# Patient Record
Sex: MALE | Race: BLACK OR AFRICAN AMERICAN | URBAN - METROPOLITAN AREA
[De-identification: names, ages, dates, MRNs, and addresses within clinical notes are randomized per-mention and may not be internally consistent; named-entity substitution may affect disease eponyms.]

---

## 2018-05-30 RX ORDER — HYDROCHLOROTHIAZIDE 25 MG/1
25 TABLET ORAL DAILY
Qty: 30 TABLET | Refills: 0 | Status: SHIPPED | OUTPATIENT
Start: 2018-05-30 | End: 2018-06-01 | Stop reason: ALTCHOICE

## 2018-06-01 ENCOUNTER — OFFICE VISIT (OUTPATIENT)
Dept: INTERNAL MEDICINE CLINIC | Age: 23
End: 2018-06-01

## 2018-06-01 VITALS
HEART RATE: 87 BPM | HEIGHT: 68 IN | SYSTOLIC BLOOD PRESSURE: 152 MMHG | OXYGEN SATURATION: 98 % | DIASTOLIC BLOOD PRESSURE: 102 MMHG | BODY MASS INDEX: 38.95 KG/M2 | WEIGHT: 257 LBS | TEMPERATURE: 98.3 F

## 2018-06-01 DIAGNOSIS — I10 ESSENTIAL HYPERTENSION: ICD-10-CM

## 2018-06-01 DIAGNOSIS — E11.9 CONTROLLED TYPE 2 DIABETES MELLITUS WITHOUT COMPLICATION, WITHOUT LONG-TERM CURRENT USE OF INSULIN (HCC): Primary | ICD-10-CM

## 2018-06-01 DIAGNOSIS — Z11.4 SCREENING FOR HIV (HUMAN IMMUNODEFICIENCY VIRUS): ICD-10-CM

## 2018-06-01 DIAGNOSIS — L83 ACANTHOSIS NIGRICANS: ICD-10-CM

## 2018-06-01 PROCEDURE — 99204 OFFICE O/P NEW MOD 45 MIN: CPT | Performed by: INTERNAL MEDICINE

## 2018-06-01 RX ORDER — LISINOPRIL 10 MG/1
10 TABLET ORAL DAILY
Qty: 30 TABLET | Refills: 3 | Status: SHIPPED | OUTPATIENT
Start: 2018-06-01

## 2018-06-01 RX ORDER — ATORVASTATIN CALCIUM 10 MG/1
10 TABLET, FILM COATED ORAL DAILY
Qty: 30 TABLET | Refills: 3 | Status: SHIPPED | OUTPATIENT
Start: 2018-06-01 | End: 2018-06-29 | Stop reason: SDUPTHER

## 2018-06-01 RX ORDER — GLIPIZIDE 5 MG/1
5 TABLET ORAL DAILY
Refills: 0 | COMMUNITY
Start: 2018-05-06 | End: 2018-06-25

## 2018-06-01 ASSESSMENT — ENCOUNTER SYMPTOMS
VOMITING: 0
DIARRHEA: 0
WHEEZING: 0
TROUBLE SWALLOWING: 0
NAUSEA: 0
ABDOMINAL PAIN: 0
SHORTNESS OF BREATH: 0
SORE THROAT: 0

## 2018-06-01 ASSESSMENT — PATIENT HEALTH QUESTIONNAIRE - PHQ9
1. LITTLE INTEREST OR PLEASURE IN DOING THINGS: 0
2. FEELING DOWN, DEPRESSED OR HOPELESS: 0
SUM OF ALL RESPONSES TO PHQ9 QUESTIONS 1 & 2: 0
SUM OF ALL RESPONSES TO PHQ QUESTIONS 1-9: 0

## 2018-06-04 DIAGNOSIS — L83 ACANTHOSIS NIGRICANS: ICD-10-CM

## 2018-06-04 DIAGNOSIS — I10 ESSENTIAL HYPERTENSION: ICD-10-CM

## 2018-06-04 DIAGNOSIS — E78.2 MIXED HYPERLIPIDEMIA: Primary | ICD-10-CM

## 2018-06-04 DIAGNOSIS — E11.9 CONTROLLED TYPE 2 DIABETES MELLITUS WITHOUT COMPLICATION, WITHOUT LONG-TERM CURRENT USE OF INSULIN (HCC): ICD-10-CM

## 2018-06-04 DIAGNOSIS — Z11.4 SCREENING FOR HIV (HUMAN IMMUNODEFICIENCY VIRUS): ICD-10-CM

## 2018-06-04 LAB
CHOLESTEROL, TOTAL: 138 MG/DL (ref 0–199)
HDLC SERPL-MCNC: 26 MG/DL (ref 40–60)
LDL CHOLESTEROL CALCULATED: ABNORMAL MG/DL
LDL CHOLESTEROL DIRECT: 54 MG/DL
TRIGL SERPL-MCNC: 460 MG/DL (ref 0–150)
TSH REFLEX: 0.91 UIU/ML (ref 0.27–4.2)
VLDLC SERPL CALC-MCNC: ABNORMAL MG/DL

## 2018-06-05 LAB
ESTIMATED AVERAGE GLUCOSE: 306.3 MG/DL
HBA1C MFR BLD: 12.3 %
HIV AG/AB: NORMAL
HIV ANTIGEN: NORMAL
HIV-1 ANTIBODY: NORMAL
HIV-2 AB: NORMAL

## 2018-06-26 PROBLEM — E44.0 MODERATE MALNUTRITION (HCC): Status: ACTIVE | Noted: 2018-06-26

## 2018-06-26 PROBLEM — E11.10 DKA, TYPE 2, NOT AT GOAL (HCC): Status: ACTIVE | Noted: 2018-06-26

## 2018-06-26 PROBLEM — E66.9 DIABETES MELLITUS TYPE 2 IN OBESE (HCC): Status: ACTIVE | Noted: 2018-06-26

## 2018-06-26 PROBLEM — E11.69 DIABETES MELLITUS TYPE 2 IN OBESE (HCC): Status: ACTIVE | Noted: 2018-06-26

## 2018-06-26 PROBLEM — E78.2 MIXED HYPERLIPIDEMIA: Status: ACTIVE | Noted: 2018-06-26

## 2018-06-26 PROBLEM — I10 ESSENTIAL HYPERTENSION: Status: ACTIVE | Noted: 2018-06-26

## 2018-06-27 RX ORDER — HYDROCHLOROTHIAZIDE 25 MG/1
TABLET ORAL
Qty: 30 TABLET | Refills: 0 | Status: SHIPPED | OUTPATIENT
Start: 2018-06-27 | End: 2018-06-29 | Stop reason: ALTCHOICE

## 2018-06-29 ENCOUNTER — OFFICE VISIT (OUTPATIENT)
Dept: INTERNAL MEDICINE CLINIC | Age: 23
End: 2018-06-29

## 2018-06-29 VITALS
DIASTOLIC BLOOD PRESSURE: 86 MMHG | HEART RATE: 85 BPM | WEIGHT: 241 LBS | HEIGHT: 68 IN | BODY MASS INDEX: 36.53 KG/M2 | TEMPERATURE: 98.9 F | SYSTOLIC BLOOD PRESSURE: 126 MMHG | OXYGEN SATURATION: 98 %

## 2018-06-29 DIAGNOSIS — E78.2 MIXED HYPERLIPIDEMIA: ICD-10-CM

## 2018-06-29 DIAGNOSIS — I10 ESSENTIAL HYPERTENSION: ICD-10-CM

## 2018-06-29 DIAGNOSIS — E11.9 CONTROLLED TYPE 2 DIABETES MELLITUS WITHOUT COMPLICATION, WITHOUT LONG-TERM CURRENT USE OF INSULIN (HCC): Primary | ICD-10-CM

## 2018-06-29 PROCEDURE — 99214 OFFICE O/P EST MOD 30 MIN: CPT | Performed by: INTERNAL MEDICINE

## 2018-06-29 RX ORDER — ATORVASTATIN CALCIUM 10 MG/1
10 TABLET, FILM COATED ORAL DAILY
Qty: 30 TABLET | Refills: 3 | Status: SHIPPED | OUTPATIENT
Start: 2018-06-29 | End: 2018-07-17 | Stop reason: SDUPTHER

## 2018-07-02 ASSESSMENT — ENCOUNTER SYMPTOMS
VOMITING: 0
WHEEZING: 0
ABDOMINAL PAIN: 0
SHORTNESS OF BREATH: 0
NAUSEA: 0
TROUBLE SWALLOWING: 0
SORE THROAT: 0
DIARRHEA: 0

## 2018-07-02 NOTE — PROGRESS NOTES
Department of Internal Medicine  Clinic Note    Date: 6/29/2018                                               Subjective/Objective:     Chief Complaint   Patient presents with    Follow-Up from Hospital     bs  still running high 350, having problems w/ diet. HPI: Patient presents today for follow-up evaluation of recent hospitalization for poorly controlled type 2 diabetes. At our previous visit patient was started on metformin but went into glucose toxicity. He presented to the emergency room in DKA. Patient was admitted to the ICU and symptoms resolved with IV insulin. Today we will continue the hospital regimen for the patient. Patient and I had a long discussion regarding diabetes and the health benefits of cardiovascular exercise. Patient will be sent for diabetic education. Patient Active Problem List    Diagnosis Date Noted    DKA, type 2, not at goal [facility]) 06/26/2018     Priority: High    Mixed hyperlipidemia 06/26/2018    Essential hypertension 06/26/2018    Diabetes mellitus type 2 in obese ([address] 06/26/2018    Moderate malnutrition ([address] 06/26/2018    Diabetic acidosis without coma ([address]        Social History:   TOBACCO:   reports that he has never smoked. He has never used smokeless tobacco.     ETOH:   reports that he drinks alcohol. Past Medical History:   Diagnosis Date    Asthma     Diabetes mellitus ([address]     Hypertension        History reviewed. No pertinent surgical history.     Admission on 06/26/2018, Discharged on 06/27/2018   Component Date Value Ref Range Status    POC Glucose 06/25/2018 >600* 70 - 99 mg/dl Final    Performed on 06/25/2018 ACCU-CHEK   Final    WBC 06/25/2018 10.3  4.0 - 11.0 K/uL Final    RBC 06/25/2018 6.05* 4.20 - 5.90 M/uL Final    Hemoglobin 06/25/2018 16.6  13.5 - 17.5 g/dL Final    Hematocrit 06/25/2018 49.4  40.5 - 52.5 % Final    MCV 06/25/2018 81.7  80.0 - 100.0 fL Final    MCH 06/25/2018 27.4  26.0 - 34.0 pg Final    MCHC Yellow  Straw/Yellow Final    Clarity, UA 06/26/2018 Clear  Clear Final    Glucose, Ur 06/26/2018 >=1000* Negative mg/dL Final    Bilirubin Urine 06/26/2018 Negative  Negative Final    Ketones, Urine 06/26/2018 40* Negative mg/dL Final    Specific Gravity, UA 06/26/2018 1.010  1.005 - 1.030 Final    Blood, Urine 06/26/2018 TRACE-INTACT* Negative Final    pH, UA 06/26/2018 5.5  5.0 - 8.0 Final    Protein, UA 06/26/2018 Negative  Negative mg/dL Final    Urobilinogen, Urine 06/26/2018 0.2  <2.0 E.U./dL Final    Nitrite, Urine 06/26/2018 Negative  Negative Final    Leukocyte Esterase, Urine 06/26/2018 Negative  Negative Final    Microscopic Examination 06/26/2018 YES   Final    Urine Reflex to Culture 06/26/2018 Not Indicated   Final    Urine Type 06/26/2018 Not Specified   Final    Beta-Hydroxybutyrate 06/25/2018 6.97* 0.00 - 0.27 mmol/L Final    pH, Rikki 06/25/2018 7.297* 7.350 - 7.450 Final    pCO2, Rikki 06/25/2018 43.3  40.0 - 50.0 mmHg Final    pO2, Rikki 06/25/2018 45.0* 25.0 - 40.0 mmHg Final    HCO3, Venous 06/25/2018 21.2* 23.0 - 29.0 mmol/L Final    Base Excess, Rikki 06/25/2018 -5.0* -3.0 - 3.0 mmol/L Final    O2 Sat, Rikki 06/25/2018 75  Not Established % Final    TC02 (Calc), Rikki 06/25/2018 22  Not Established mmol/L Final    O2 Therapy 06/25/2018 Unknown   Final    Hemoglobin, Rikki, Reduced 06/25/2018 17  % Final    WBC, UA 06/26/2018 0-2  0 - 5 /HPF Final    RBC, UA 06/26/2018 0-2  0 - 2 /HPF Final    Epi Cells 06/26/2018 0-2  /HPF Final    Bacteria, UA 06/26/2018 Rare* /HPF Final    POC Glucose 06/26/2018 >600* 70 - 99 mg/dl Final    Performed on 06/26/2018 ACCU-CHEK   Final    Beta-Hydroxybutyrate 06/26/2018 7.00* 0.00 - 0.27 mmol/L Final    Sodium 06/26/2018 133* 136 - 145 mmol/L Final    Potassium 06/26/2018 3.8  3.5 - 5.1 mmol/L Final    Chloride 06/26/2018 90* 99 - 110 mmol/L Final    CO2 06/26/2018 17* 21 - 32 mmol/L Final    Anion Gap 06/26/2018 26* 3 - 16 Final     Performed on 06/26/2018 ACCU-CHEK   Final    POC Glucose 06/26/2018 273* 70 - 99 mg/dl Final    Performed on 06/26/2018 ACCU-CHEK   Final    POC Glucose 06/26/2018 197* 70 - 99 mg/dl Final    Performed on 06/26/2018 ACCU-CHEK   Final    POC Glucose 06/26/2018 193* 70 - 99 mg/dl Final    Performed on 06/26/2018 ACCU-CHEK   Final    POC Glucose 06/26/2018 208* 70 - 99 mg/dl Final    Performed on 06/26/2018 ACCU-CHEK   Final    POC Glucose 06/26/2018 213* 70 - 99 mg/dl Final    Performed on 06/26/2018 ACCU-CHEK   Final    POC Glucose 06/26/2018 226* 70 - 99 mg/dl Final    Performed on 06/26/2018 ACCU-CHEK   Final    POC Glucose 06/26/2018 188* 70 - 99 mg/dl Final    Performed on 06/26/2018 ACCU-CHEK   Final    Sodium 06/26/2018 136  136 - 145 mmol/L Final    Potassium 06/26/2018 3.5  3.5 - 5.1 mmol/L Final    Chloride 06/26/2018 98* 99 - 110 mmol/L Final    CO2 06/26/2018 25  21 - 32 mmol/L Final    Anion Gap 06/26/2018 13  3 - 16 Final    Glucose 06/26/2018 198* 70 - 99 mg/dL Final    BUN 06/26/2018 12  7 - 20 mg/dL Final    CREATININE 06/26/2018 0.9  0.9 - 1.3 mg/dL Final    GFR Non- 06/26/2018 >60  >60 Final    Comment: >60 mL/min/1.73m2 EGFR, calc. for ages 25 and older using the  MDRD formula (not corrected for weight), is valid for stable  renal function.  GFR  06/26/2018 >60  >60 Final    Comment: Chronic Kidney Disease: less than 60 ml/min/1.73 sq.m. Kidney Failure: less than 15 ml/min/1.73 sq.m. Results valid for patients 18 years and older.       Calcium 06/26/2018 8.3  8.3 - 10.6 mg/dL Final    Phosphorus 06/26/2018 2.6  2.5 - 4.9 mg/dL Final    Magnesium 06/26/2018 2.00  1.80 - 2.40 mg/dL Final    POC Glucose 06/26/2018 168* 70 - 99 mg/dl Final    Performed on 06/26/2018 ACCU-CHEK   Final    POC Glucose 06/26/2018 198* 70 - 99 mg/dl Final    Performed on 06/26/2018 ACCU-CHEK   Final    Sodium 06/27/2018 135* 136 - 145 mmol/L insulin glargine (LANTUS SOLOSTAR) 100 UNIT/ML injection pen Inject 15 Units into the skin nightly 5 pen 3    blood glucose test strips (FREESTYLE LITE) strip As needed. 250 strip 5    metFORMIN (GLUCOPHAGE) 500 MG tablet TAKE 1 TABLET BY MOUTH TWICE A DAY WITH FOOD 60 tablet 0    glucose monitoring kit (FREESTYLE) monitoring kit 1 kit by Does not apply route daily as needed (serum glucose) 1 kit 0    FREESTYLE LANCETS MISC 1 each by Does not apply route 4 times daily (with meals and nightly) 120 each 3    insulin lispro (HUMALOG KWIKPEN) 100 UNIT/ML pen Inject 0-6 Units into the skin 3 times daily (before meals) 5 pen 3    Insulin Pen Needle 31G X 8 MM MISC 1 each by Does not apply route daily 100 each 3    lisinopril (PRINIVIL) 10 MG tablet Take 1 tablet by mouth daily 30 tablet 3     No current facility-administered medications for this visit. No Known Allergies    Review of Systems   Constitutional: Negative for chills, fatigue and fever. HENT: Negative for ear pain, sore throat, tinnitus and trouble swallowing. Eyes: Negative for visual disturbance. Respiratory: Negative for shortness of breath and wheezing. Cardiovascular: Negative for chest pain and palpitations. Gastrointestinal: Negative for abdominal pain, diarrhea, nausea and vomiting. Endocrine: Negative for cold intolerance and heat intolerance. Genitourinary: Negative for difficulty urinating and dysuria. Neurological: Negative for dizziness, weakness and numbness. Psychiatric/Behavioral: Negative for agitation, decreased concentration and suicidal ideas. The patient is not nervous/anxious. All other systems reviewed and are negative. Vitals:  /86 (Site: Right Arm, Cuff Size: Large Adult)   Pulse 85   Temp 98.9 °F (37.2 °C) (Oral)   Ht 5' 7.5\" (1.715 m)   Wt 241 lb (109.3 kg)   SpO2 98%   BMI 37.19 kg/m²     Physical Exam   Constitutional: He is oriented to person, place, and time.  He appears

## 2018-07-06 ENCOUNTER — TELEPHONE (OUTPATIENT)
Dept: INTERNAL MEDICINE CLINIC | Age: 23
End: 2018-07-06

## 2018-07-06 ENCOUNTER — OFFICE VISIT (OUTPATIENT)
Dept: INTERNAL MEDICINE CLINIC | Age: 23
End: 2018-07-06

## 2018-07-06 VITALS
WEIGHT: 242 LBS | OXYGEN SATURATION: 98 % | BODY MASS INDEX: 37.34 KG/M2 | SYSTOLIC BLOOD PRESSURE: 128 MMHG | DIASTOLIC BLOOD PRESSURE: 84 MMHG | HEART RATE: 96 BPM

## 2018-07-06 DIAGNOSIS — I10 ESSENTIAL HYPERTENSION: ICD-10-CM

## 2018-07-06 DIAGNOSIS — E11.10 DKA, TYPE 2, NOT AT GOAL (HCC): ICD-10-CM

## 2018-07-06 DIAGNOSIS — E78.2 MIXED HYPERLIPIDEMIA: ICD-10-CM

## 2018-07-06 DIAGNOSIS — E11.9 CONTROLLED TYPE 2 DIABETES MELLITUS WITHOUT COMPLICATION, WITHOUT LONG-TERM CURRENT USE OF INSULIN (HCC): ICD-10-CM

## 2018-07-06 DIAGNOSIS — E11.9 CONTROLLED TYPE 2 DIABETES MELLITUS WITHOUT COMPLICATION, WITHOUT LONG-TERM CURRENT USE OF INSULIN (HCC): Primary | ICD-10-CM

## 2018-07-06 PROCEDURE — 99214 OFFICE O/P EST MOD 30 MIN: CPT | Performed by: INTERNAL MEDICINE

## 2018-07-06 ASSESSMENT — ENCOUNTER SYMPTOMS
TROUBLE SWALLOWING: 0
SORE THROAT: 0
DIARRHEA: 0
NAUSEA: 0
WHEEZING: 0
VOMITING: 0
SHORTNESS OF BREATH: 0
ABDOMINAL PAIN: 0

## 2018-07-06 NOTE — TELEPHONE ENCOUNTER
[facility] received [facility] same [facility] but did not receive updated directions.  They are asking if the direction on how often the pt is testing can be added onto script

## 2018-07-06 NOTE — TELEPHONE ENCOUNTER
[facility] has to have a specific set of directions on how often the pt is testing    blood glucose test strips (FREESTYLE LITE) strip 250 strip 5 6/29/2018     Sig: As needed.       Pharmacy:  [address] [place] [address] [phone]

## 2018-07-06 NOTE — PROGRESS NOTES
Department of Internal Medicine  Clinic Note    Date: 7/6/2018                                               Subjective/Objective:     Chief Complaint   Patient presents with    Diabetes     BS are up and down     HPI: Pt presents today for f/u evaluation of newly diagnosed DM2. His medications are currently being adjusted. He brings in a lists of his daily medications today. We will adjust his meds accordingly. His fasting glucose ranged between 330-450. Patient Active Problem List    Diagnosis Date Noted    DKA, type 2, not at goal [facility]) 06/26/2018     Priority: High    Mixed hyperlipidemia 06/26/2018    Essential hypertension 06/26/2018    Diabetes mellitus type 2 in obese ([address] 06/26/2018    Moderate malnutrition ([facility] 06/26/2018    Diabetic acidosis without coma ([facility]      Social History:   TOBACCO:   reports that he has never smoked. He has never used smokeless tobacco.     ETOH:   reports that he drinks alcohol. Past Medical History:   Diagnosis Date    Asthma     Diabetes mellitus ([facility]     Hypertension      No past surgical history on file.     Admission on 06/26/2018, Discharged on 06/27/2018   Component Date Value Ref Range Status    POC Glucose 06/25/2018 >600* 70 - 99 mg/dl Final    Performed on 06/25/2018 ACCU-CHEK   Final    WBC 06/25/2018 10.3  4.0 - 11.0 K/uL Final    RBC 06/25/2018 6.05* 4.20 - 5.90 M/uL Final    Hemoglobin 06/25/2018 16.6  13.5 - 17.5 g/dL Final    Hematocrit 06/25/2018 49.4  40.5 - 52.5 % Final    MCV 06/25/2018 81.7  80.0 - 100.0 fL Final    MCH 06/25/2018 27.4  26.0 - 34.0 pg Final    MCHC 06/25/2018 33.5  31.0 - 36.0 g/dL Final    RDW 06/25/2018 15.2  12.4 - 15.4 % Final    Platelets 15/43/3207 264  135 - 450 K/uL Final    MPV 06/25/2018 8.5  5.0 - 10.5 fL Final    Neutrophils % 06/25/2018 51.5  % Final    Lymphocytes % 06/25/2018 41.0  % Final    Monocytes % 06/25/2018 6.6  % Final    Eosinophils % 06/25/2018 0.3  % Final    Basophils Calcium 06/26/2018 8.2* 8.3 - 10.6 mg/dL Final    Magnesium 06/26/2018 2.00  1.80 - 2.40 mg/dL Final    Magnesium 06/26/2018 2.10  1.80 - 2.40 mg/dL Final    Magnesium 06/26/2018 2.00  1.80 - 2.40 mg/dL Final    Phosphorus 06/26/2018 4.2  2.5 - 4.9 mg/dL Final    Phosphorus 06/26/2018 3.4  2.5 - 4.9 mg/dL Final    Phosphorus 06/26/2018 2.7  2.5 - 4.9 mg/dL Final    WBC 06/26/2018 8.4  4.0 - 11.0 K/uL Final    RBC 06/26/2018 5.49  4.20 - 5.90 M/uL Final    Hemoglobin 06/26/2018 14.9  13.5 - 17.5 g/dL Final    Hematocrit 06/26/2018 43.9  40.5 - 52.5 % Final    MCV 06/26/2018 79.9* 80.0 - 100.0 fL Final    MCH 06/26/2018 27.1  26.0 - 34.0 pg Final    MCHC 06/26/2018 33.9  31.0 - 36.0 g/dL Final    RDW 06/26/2018 15.4  12.4 - 15.4 % Final    Platelets 97/14/7929 216  135 - 450 K/uL Final    MPV 06/26/2018 8.3  5.0 - 10.5 fL Final    Neutrophils % 06/26/2018 46.8  % Final    Lymphocytes % 06/26/2018 45.1  % Final    Monocytes % 06/26/2018 7.2  % Final    Eosinophils % 06/26/2018 0.4  % Final    Basophils % 06/26/2018 0.5  % Final    Neutrophils # 06/26/2018 3.9  1.7 - 7.7 K/uL Final    Lymphocytes # 06/26/2018 3.8  1.0 - 5.1 K/uL Final    Monocytes # 06/26/2018 0.6  0.0 - 1.3 K/uL Final    Eosinophils # 06/26/2018 0.0  0.0 - 0.6 K/uL Final    Basophils # 06/26/2018 0.0  0.0 - 0.2 K/uL Final    POC Glucose 06/26/2018 421* 70 - 99 mg/dl Final    Performed on 06/26/2018 ACCU-CHEK   Final    POC Glucose 06/26/2018 350* 70 - 99 mg/dl Final    Performed on 06/26/2018 ACCU-CHEK   Final    POC Glucose 06/26/2018 298* 70 - 99 mg/dl Final    Performed on 06/26/2018 ACCU-CHEK   Final    POC Glucose 06/26/2018 273* 70 - 99 mg/dl Final    Performed on 06/26/2018 ACCU-CHEK   Final    POC Glucose 06/26/2018 197* 70 - 99 mg/dl Final    Performed on 06/26/2018 ACCU-CHEK   Final    POC Glucose 06/26/2018 193* 70 - 99 mg/dl Final    Performed on 06/26/2018 ACCU-CHEK   Final    POC Glucose 06/26/2018 06/27/2018 >60  >60 Final    Comment: >60 mL/min/1.73m2 EGFR, calc. for ages 25 and older using the  MDRD formula (not corrected for weight), is valid for stable  renal function.  GFR  06/27/2018 >60  >60 Final    Comment: Chronic Kidney Disease: less than 60 ml/min/1.73 sq.m. Kidney Failure: less than 15 ml/min/1.73 sq.m. Results valid for patients 18 years and older.       Calcium 06/27/2018 8.3  8.3 - 10.6 mg/dL Final    Magnesium 06/27/2018 2.20  1.80 - 2.40 mg/dL Final    Phosphorus 06/27/2018 2.5  2.5 - 4.9 mg/dL Final    POC Glucose 06/26/2018 174* 70 - 99 mg/dl Final    Performed on 06/26/2018 ACCU-CHEK   Final    POC Glucose 06/26/2018 318* 70 - 99 mg/dl Final    Performed on 06/26/2018 ACCU-CHEK   Final    POC Glucose 06/27/2018 356* 70 - 99 mg/dl Final    Performed on 06/27/2018 ACCU-CHEK   Final    POC Glucose 06/27/2018 300* 70 - 99 mg/dl Final    Performed on 06/27/2018 ACCU-CHEK   Final    POC Glucose 06/27/2018 228* 70 - 99 mg/dl Final    Performed on 06/27/2018 ACCU-CHEK   Final    POC Glucose 06/27/2018 395* 70 - 99 mg/dl Final    Performed on 06/27/2018 ACCU-CHEK   Final    POC Glucose 06/27/2018 492* 70 - 99 mg/dl Final    Performed on 06/27/2018 ACCU-CHEK   Final    POC Glucose 06/27/2018 465* 70 - 99 mg/dl Final    Performed on 06/27/2018 ACCU-CHEK   Final       Family History   Problem Relation Age of Onset    Heart Attack Father        Current Outpatient Prescriptions   Medication Sig Dispense Refill    metFORMIN (GLUCOPHAGE) 500 MG tablet TAKE 1 TABLET BY MOUTH TWICE A DAY WITH FOOD 60 tablet 0    insulin glargine (LANTUS SOLOSTAR) 100 UNIT/ML injection pen Inject 20 Units into the skin nightly 5 pen 3    insulin lispro (HUMALOG KWIKPEN) 100 UNIT/ML pen Inject 0-9 Units into the skin 3 times daily (before meals) 5 pen 3    glucose monitoring kit (FREESTYLE) monitoring kit 1 kit by Does not apply route daily as needed (serum glucose) 1 kit 0    FREESTYLE LANCETS MISC 1 each by Does not apply route 4 times daily (with meals and nightly) 120 each 3    lisinopril (PRINIVIL) 10 MG tablet Take 1 tablet by mouth daily 30 tablet 3    Insulin Pen Needle 31G X 8 MM MISC 1 each by Does not apply route daily 100 each 3    atorvastatin (LIPITOR) 10 MG tablet Take 1 tablet by mouth daily 30 tablet 3    blood glucose test strips (FREESTYLE LITE) strip As needed. 250 strip 5     No current facility-administered medications for this visit. No Known Allergies    Review of Systems   Constitutional: Negative for chills, fatigue and fever. HENT: Negative for ear pain, sore throat, tinnitus and trouble swallowing. Eyes: Negative for visual disturbance. Respiratory: Negative for shortness of breath and wheezing. Cardiovascular: Negative for chest pain and palpitations. Gastrointestinal: Negative for abdominal pain, diarrhea, nausea and vomiting. Endocrine: Negative for cold intolerance and heat intolerance. Genitourinary: Negative for difficulty urinating and dysuria. Neurological: Negative for dizziness, weakness and numbness. Psychiatric/Behavioral: Negative for agitation, decreased concentration and suicidal ideas. The patient is not nervous/anxious. All other systems reviewed and are negative. Vitals:  /84 (Site: Right Arm, Cuff Size: Large Adult)   Pulse 96   Wt 242 lb (109.8 kg)   SpO2 98%   BMI 37.34 kg/m²     Physical Exam   Constitutional: He is oriented to person, place, and time. He appears well-developed and well-nourished. HENT:   Head: Normocephalic and atraumatic. Eyes: Conjunctivae and lids are normal. Pupils are equal, round, and reactive to light. Neck: Trachea normal and normal range of motion. No hepatojugular reflux and no JVD present. Carotid bruit is not present. No thyromegaly present. Cardiovascular: Normal rate, regular rhythm and normal heart sounds. No murmur heard.   Pulmonary/Chest: Effort normal and breath sounds normal. No respiratory distress. Abdominal: Soft. Normal appearance and bowel sounds are normal. He exhibits no distension. There is no tenderness. Musculoskeletal: Normal range of motion. Lymphadenopathy:     He has no cervical adenopathy. Neurological: He is alert and oriented to person, place, and time. He has normal strength. No cranial nerve deficit or sensory deficit. Skin: Skin is warm, dry and intact. No rash noted. No cyanosis. Nails show no clubbing. Psychiatric: He has a normal mood and affect. His speech is normal and behavior is normal. Thought content normal.     Assessment/Plan     1. Controlled type 2 diabetes mellitus without complication, without long-term current use of insulin ([facility])  - insulin glargine (LANTUS SOLOSTAR) 100 UNIT/ML injection pen; Inject 20 Units into the skin nightly  Dispense: 5 pen; Refill: 3  - insulin lispro (HUMALOG KWIKPEN) 100 UNIT/ML pen; Inject 0-9 Units into the skin 3 times daily (before meals)  Dispense: 5 pen; Refill: 3  - C-PEPTIDE; Future  - GLUTAMIC ACID DECARBOXYLASE; Future  - INSULIN ANTIBODY; Future  - ANTI-ISLET CELL ANTIBODY; Future    2. DKA, type 2, not at goal [facility])  - insulin lispro (HUMALOG KWIKPEN) 100 UNIT/ML pen; Inject 0-9 Units into the skin 3 times daily (before meals)  Dispense: 5 pen; Refill: 3  - C-PEPTIDE; Future  - GLUTAMIC ACID DECARBOXYLASE; Future  - INSULIN ANTIBODY; Future  - ANTI-ISLET CELL ANTIBODY; Future    3. Mixed hyperlipidemia  - Atorvastatin 10 mg for diabetes2 protection    4.  Essential hypertension  - Lisinopril 10 mg    Orders Placed This Encounter   Procedures    C-PEPTIDE     Standing Status:   Future     Number of Occurrences:   1     Standing Expiration Date:   7/6/2019    GLUTAMIC ACID DECARBOXYLASE     Standing Status:   Future     Number of Occurrences:   1     Standing Expiration Date:   7/6/2019    INSULIN ANTIBODY     Standing Status:   Future     Number of Occurrences:

## 2018-07-08 LAB
GLUTAMIC ACID DECARB AB: <5 IU/ML (ref 0–5)
ISLET CELL ANTIBODY: NORMAL

## 2018-07-10 LAB — C-PEPTIDE: 4.8 NG/ML (ref 1.1–4.4)

## 2018-07-13 LAB — INSULIN A: <0.4 U/ML (ref 0–0.4)

## 2018-07-17 DIAGNOSIS — I10 ESSENTIAL HYPERTENSION: ICD-10-CM

## 2018-07-17 DIAGNOSIS — E66.9 DIABETES MELLITUS TYPE 2 IN OBESE (HCC): Primary | ICD-10-CM

## 2018-07-17 DIAGNOSIS — E78.2 MIXED HYPERLIPIDEMIA: ICD-10-CM

## 2018-07-17 DIAGNOSIS — E11.9 CONTROLLED TYPE 2 DIABETES MELLITUS WITHOUT COMPLICATION, WITHOUT LONG-TERM CURRENT USE OF INSULIN (HCC): ICD-10-CM

## 2018-07-17 DIAGNOSIS — E11.69 DIABETES MELLITUS TYPE 2 IN OBESE (HCC): Primary | ICD-10-CM

## 2018-07-17 RX ORDER — ATORVASTATIN CALCIUM 10 MG/1
10 TABLET, FILM COATED ORAL DAILY
Qty: 30 TABLET | Refills: 3 | Status: SHIPPED | OUTPATIENT
Start: 2018-07-17

## 2018-07-24 ENCOUNTER — TELEPHONE (OUTPATIENT)
Dept: INTERNAL MEDICINE CLINIC | Age: 23
End: 2018-07-24

## 2018-07-24 NOTE — TELEPHONE ENCOUNTER
[address] [facility] Dept.    #[phone]  Fax [phone]    Re:  Claim #[id number]    Need to obtain missing information from the Healthcare Provider Certification Form  Missing health condition  Confirm frequency and duration  Confirm a certification period    Because of the frequency and duration on the form   will need to confirm pending absences can be approved  or a change in the certification    Please Advise